# Patient Record
(demographics unavailable — no encounter records)

---

## 2024-10-25 NOTE — REVIEW OF SYSTEMS
[Negative] : Allergic/Immunologic [Skin Hyperpigmentation: Grade 1 - Hyperpigmentation covering <10% BSA; no psychosocial impact] : Skin Hyperpigmentation: Grade 1 - Hyperpigmentation covering <10% BSA; no psychosocial impact [de-identified] : right breast hyperpigmentation

## 2024-10-25 NOTE — HISTORY OF PRESENT ILLNESS
[FreeTextEntry1] : Ms. 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 Pathology yielded DCIS, micropapillary and cribriform type, high nuclear grade, spanning 4.5 cm, DCIS present <1mm from inked anterior, <1mm inked lateral, 1.5mm from inked superior, + calcs. Supplemental right superior margin with DCIS present 1mm from final superior margin, supplemental lateral margin with DCIS >2mm, supplemental right anterior margin negative; 0/10 lymph nodes ER-AZ-. Re-excision of right lateral superior margin revealed DCIS located more than 2mm from superior margin. Planned for adjuvant radiation. 2/2/2024 She completed radiation to the right breast to a total dose of 5240cGy in 20 Fx(4240cGy/16Fx plus 1000cGy/4 Fx boost).  3/8/2024 She presents for post treatment evaluation. While on treatment, she developed mild hyperpigmentation managed with Aquaphor and Aloe. Sharp shooting pain to the right breast and nipple sensitivity.  Follow up with Dr Patterson on 3/4/24-  mammo/US 10/2024  10/1/2024 Post treatment Diagnostic Mammo/sono: BI-RADS 2 - Benign Finding of cyst measuring 0.4 x 0.3 x 0.2 cm. Mammography in 1 year  10/25/2024 She presents for follow up. Right breast post radiation atrophy. She followed up with Dr Patterson on 10/2/24- MRI breast 4/2025. RUE swelling is resolved.

## 2024-10-25 NOTE — REVIEW OF SYSTEMS
[Negative] : Allergic/Immunologic [Skin Hyperpigmentation: Grade 1 - Hyperpigmentation covering <10% BSA; no psychosocial impact] : Skin Hyperpigmentation: Grade 1 - Hyperpigmentation covering <10% BSA; no psychosocial impact [de-identified] : right breast hyperpigmentation

## 2024-10-25 NOTE — PHYSICAL EXAM
[Sclera] : the sclera and conjunctiva were normal [Extraocular Movements] : extraocular movements were intact [] : no respiratory distress [Exaggerated Use Of Accessory Muscles For Inspiration] : no accessory muscle use [Heart Sounds] : normal S1 and S2 [Arterial Pulses Normal] : the arterial pulses were normal [Musculoskeletal - Swelling] : no joint swelling [Nail Clubbing] : no clubbing  or cyanosis of the fingernails [Abdomen Soft] : soft [Nondistended] : nondistended [Normal] : normal spine exam without palpable tenderness, no kyphosis or scoliosis [de-identified] : well-healing right upper inner circumareolar incision, C/D/I. L>R breast. No palpable abnormalities. [de-identified] : mild hyperpigmentation of the right breast

## 2024-10-25 NOTE — PHYSICAL EXAM
[Sclera] : the sclera and conjunctiva were normal [Extraocular Movements] : extraocular movements were intact [] : no respiratory distress [Exaggerated Use Of Accessory Muscles For Inspiration] : no accessory muscle use [Heart Sounds] : normal S1 and S2 [Arterial Pulses Normal] : the arterial pulses were normal [Musculoskeletal - Swelling] : no joint swelling [Nail Clubbing] : no clubbing  or cyanosis of the fingernails [Abdomen Soft] : soft [Nondistended] : nondistended [Normal] : normal spine exam without palpable tenderness, no kyphosis or scoliosis [de-identified] : well-healing right upper inner circumareolar incision, C/D/I. L>R breast. No palpable abnormalities. [de-identified] : mild hyperpigmentation of the right breast

## 2024-10-25 NOTE — HISTORY OF PRESENT ILLNESS
[FreeTextEntry1] : Ms. 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 Pathology yielded DCIS, micropapillary and cribriform type, high nuclear grade, spanning 4.5 cm, DCIS present <1mm from inked anterior, <1mm inked lateral, 1.5mm from inked superior, + calcs. Supplemental right superior margin with DCIS present 1mm from final superior margin, supplemental lateral margin with DCIS >2mm, supplemental right anterior margin negative; 0/10 lymph nodes ER-NV-. Re-excision of right lateral superior margin revealed DCIS located more than 2mm from superior margin. Planned for adjuvant radiation. 2/2/2024 She completed radiation to the right breast to a total dose of 5240cGy in 20 Fx(4240cGy/16Fx plus 1000cGy/4 Fx boost).  3/8/2024 She presents for post treatment evaluation. While on treatment, she developed mild hyperpigmentation managed with Aquaphor and Aloe. Sharp shooting pain to the right breast and nipple sensitivity.  Follow up with Dr Patterson on 3/4/24-  mammo/US 10/2024  10/1/2024 Post treatment Diagnostic Mammo/sono: BI-RADS 2 - Benign Finding of cyst measuring 0.4 x 0.3 x 0.2 cm. Mammography in 1 year  10/25/2024 She presents for follow up. Right breast post radiation atrophy. She followed up with Dr Patterson on 10/2/24- MRI breast 4/2025. RUE swelling is resolved.

## 2025-03-06 NOTE — HISTORY OF PRESENT ILLNESS
[FreeTextEntry1] : follow up [de-identified] : follow up  went to ER for back pain/sciatica will refer to ortho spine

## 2025-03-06 NOTE — HEALTH RISK ASSESSMENT
[No] : In the past 12 months have you used drugs other than those required for medical reasons? No [No falls in past year] : Patient reported no falls in the past year [0] : 2) Feeling down, depressed, or hopeless: Not at all (0) [Never] : Never [Audit-CScore] : 0 [de-identified] : walking [de-identified] : healthy [NER1Dnqvh] : 0

## 2025-03-13 NOTE — HISTORY OF PRESENT ILLNESS
[de-identified] : The patient was recently seen in the ED complaining of left-sided low back pain radiating to left leg that for about 3 weeks ago. Pain did not respond to Aleve, and Advil but did not work.  She states she was bending over washing her hair for approximately 15 minutes and her back started to hurt. She was given cyclobenzaprine that helps slightly.  The pain is primarily located in the lower back.  She denies any weakness, bowel or bladder dysfunction.  There has been no imaging studies.  She has not had any physical therapy, acupuncture or pain management.

## 2025-03-13 NOTE — HISTORY OF PRESENT ILLNESS
[de-identified] : The patient was recently seen in the ED complaining of left-sided low back pain radiating to left leg that for about 3 weeks ago. Pain did not respond to Aleve, and Advil but did not work.  She states she was bending over washing her hair for approximately 15 minutes and her back started to hurt. She was given cyclobenzaprine that helps slightly.  The pain is primarily located in the lower back.  She denies any weakness, bowel or bladder dysfunction.  There has been no imaging studies.  She has not had any physical therapy, acupuncture or pain management.

## 2025-03-13 NOTE — ASSESSMENT
[FreeTextEntry1] : 38 years old woman with low back pain radiating to left leg However the primary pain is in the lower back.  Normal neurological exam.  I have suggested a course of physical therapy and will arrange for her to get lumbar flexion-extension x-rays.

## 2025-03-13 NOTE — PHYSICAL EXAM
[General Appearance - Alert] : alert [General Appearance - In No Acute Distress] : in no acute distress [General Appearance - Well Nourished] : well nourished [General Appearance - Well Developed] : well developed [Oriented To Time, Place, And Person] : oriented to person, place, and time [Impaired Insight] : insight and judgment were intact [Motor Strength] : muscle strength was normal in all four extremities [Balance] : balance was intact [Able to toe walk] : the patient was able to toe walk [Able to heel walk] : the patient was able to heel walk [No Spinal Tenderness] : no spinal tenderness [Abnormal Walk] : normal gait [Involuntary Movements] : no involuntary movements were seen [Musculoskeletal - Swelling] : no joint swelling seen [Motor Tone] : muscle strength and tone were normal [2+] : Ankle jerk left 2+ [Straight-Leg Raise Test - Left] : straight leg raise of the left leg was negative [Straight-Leg Raise Test - Right] : straight leg raise  of the right leg was negative

## 2025-05-01 NOTE — ASSESSMENT
[FreeTextEntry1] : IMPRESSION  38 years old woman with low back pain radiating to left leg, but the primary pain is in the lower back. Normal neurological exam. She is continuing with PT for her back. Lumbar flexion-extension x-rays report no instability.   PLAN Continue PT for back and core strengthening exercises F/U as needed/worsening symptoms

## 2025-05-01 NOTE — HISTORY OF PRESENT ILLNESS
[FreeTextEntry1] : The patient was recently seen in the ED complaining of left-sided low back pain radiating to left leg that for about 3 weeks ago. Pain did not respond to Aleve, and Advil but did not work.  She states she was bending over washing her hair for approximately 15 minutes and her back started to hurt. She was given cyclobenzaprine that helps slightly.  She has not had any physical therapy, acupuncture or pain management.  The pain is primarily located in the lower back. Pt continue doing PT for her back, which is helping.  She also uses the lidocaine patch and heating pad also helping. She denies any weakness, bowel or bladder dysfunction.   Recent x-rays do not show any fracture, subluxation or evidence of dynamic instability.  Recent x-rays do not show any evidence of fracture, subluxation or dynamic instability.

## 2025-06-02 NOTE — PAST MEDICAL HISTORY
[Menstruating] : The patient is menstruating [Menarche Age ____] : age at menarche was [unfilled] [Definite ___ (Date)] : the last menstrual period was [unfilled] [Irregular Cycle Intervals] : are  irregular [Total Preg ___] : G[unfilled] [Live Births ___] : P[unfilled]  [Age At Live Birth ___] : Age at live birth: [unfilled] [History of Hormone Replacement Treatment] : has no history of hormone replacement treatment [FreeTextEntry6] : None [FreeTextEntry7] : 12 years, not currently [FreeTextEntry8] : 1 year
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
## 2025-06-02 NOTE — ASSESSMENT
[FreeTextEntry1] : Ms. MEENU WHITTINGTON is a 38-year-old woman, referred for consultation by Carmen Bonner NP for Right breast DCIS, s/p R lumpectomy and SLNB on 11/17/2023 and R breast margin re-excision on 11/30/2023, here for a follow up visit.  11/17/2023 R Lumpectomy & SLNB -R DCIS 45 mm, high grade w/ necrosis, ER/UT negative -0/10 R axillary SLN -Negative margins but 0.5 mm focus at 1 mm from the superior margin -ER/UT negative -pTisN0, AJCC stage 0  11/30/2023 R breast margin re-excision -R DCIS (15 mm) high nuclear grade w/ necrosis -Negative margins -pTisN0, AJCC stage 0  Patient completed adjuvant RT with Dr. Anel Viramontes 1/4/2024- 1/31/2024.  Recent Imaging, BR2.  Exam today shows expected skin changes from RT, no new masses or lymphadenopathy  Plan: -B/L SM/US 10/2025 - MRI 10/2025  - Continue with PT/breast massage/Lymphedema f/u  - RTO 6 months

## 2025-06-02 NOTE — PHYSICAL EXAM
[Normocephalic] : normocephalic [Atraumatic] : atraumatic [EOMI] : extra ocular movement intact [PERRL] : pupils equal, round and reactive to light [Sclera nonicteric] : sclera nonicteric [Supple] : supple [No Supraclavicular Adenopathy] : no supraclavicular adenopathy [No Cervical Adenopathy] : no cervical adenopathy [Examined in the supine and seated position] : examined in the supine and seated position [Asymmetrical] : asymmetrical [Bra Size: ___] : Bra Size: [unfilled] [No dominant masses] : no dominant masses in right breast  [No dominant masses] : no dominant masses left breast [No Nipple Retraction] : no left nipple retraction [No Nipple Discharge] : no left nipple discharge [Breast Mass Right Breast ___cm] : no masses [Breast Mass Left Breast ___cm] : no masses [Breast Nipple Inversion] : nipples not inverted [Breast Nipple Retraction] : nipples not retracted [Breast Nipple Flattening] : nipples not flattened [Breast Nipple Fissures] : nipples not fissured [Breast Abnormal Lactation (Galactorrhea)] : no galactorrhea [Breast Abnormal Secretion Bloody Fluid] : no bloody discharge [Breast Abnormal Secretion Serous Fluid] : no serous discharge [Breast Abnormal Secretion Opalescent Fluid] : no milky discharge [No Axillary Lymphadenopathy] : no left axillary lymphadenopathy [No Edema] : no edema [No Swelling] : no swelling [No Rashes] : no rashes [No Ulceration] : no ulceration [de-identified] : non-labored respirations  [de-identified] : L>R [de-identified] : upper inner circumareolar incision well-healed, induration in area of surgery. PostRT skin hyperpigmentation [de-identified] : well-healed incision, no seroma, no erythema, post RT change

## 2025-06-02 NOTE — PHYSICAL EXAM
[Normocephalic] : normocephalic [Atraumatic] : atraumatic [EOMI] : extra ocular movement intact [PERRL] : pupils equal, round and reactive to light [Sclera nonicteric] : sclera nonicteric [Supple] : supple [No Supraclavicular Adenopathy] : no supraclavicular adenopathy [No Cervical Adenopathy] : no cervical adenopathy [Examined in the supine and seated position] : examined in the supine and seated position [Asymmetrical] : asymmetrical [Bra Size: ___] : Bra Size: [unfilled] [No dominant masses] : no dominant masses in right breast  [No dominant masses] : no dominant masses left breast [No Nipple Retraction] : no left nipple retraction [No Nipple Discharge] : no left nipple discharge [Breast Mass Right Breast ___cm] : no masses [Breast Mass Left Breast ___cm] : no masses [Breast Nipple Inversion] : nipples not inverted [Breast Nipple Retraction] : nipples not retracted [Breast Nipple Flattening] : nipples not flattened [Breast Nipple Fissures] : nipples not fissured [Breast Abnormal Lactation (Galactorrhea)] : no galactorrhea [Breast Abnormal Secretion Bloody Fluid] : no bloody discharge [Breast Abnormal Secretion Serous Fluid] : no serous discharge [Breast Abnormal Secretion Opalescent Fluid] : no milky discharge [No Axillary Lymphadenopathy] : no left axillary lymphadenopathy [No Edema] : no edema [No Swelling] : no swelling [No Rashes] : no rashes [No Ulceration] : no ulceration [de-identified] : non-labored respirations  [de-identified] : L>R [de-identified] : upper inner circumareolar incision well-healed, induration in area of surgery. PostRT skin hyperpigmentation [de-identified] : well-healed incision, no seroma, no erythema, post RT change

## 2025-06-02 NOTE — ASSESSMENT
[FreeTextEntry1] : Ms. MEENU WHITTINGTON is a 38-year-old woman, referred for consultation by Carmen Bonner NP for Right breast DCIS, s/p R lumpectomy and SLNB on 11/17/2023 and R breast margin re-excision on 11/30/2023, here for a follow up visit.  11/17/2023 R Lumpectomy & SLNB -R DCIS 45 mm, high grade w/ necrosis, ER/MT negative -0/10 R axillary SLN -Negative margins but 0.5 mm focus at 1 mm from the superior margin -ER/MT negative -pTisN0, AJCC stage 0  11/30/2023 R breast margin re-excision -R DCIS (15 mm) high nuclear grade w/ necrosis -Negative margins -pTisN0, AJCC stage 0  Patient completed adjuvant RT with Dr. Anel Viramontes 1/4/2024- 1/31/2024.  Recent Imaging, BR2.  Exam today shows expected skin changes from RT, no new masses or lymphadenopathy  Plan: -B/L SM/US 10/2025 - MRI 10/2025  - Continue with PT/breast massage/Lymphedema f/u  - RTO 6 months
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
## 2025-06-02 NOTE — CONSULT LETTER
[Dear  ___] : Dear ~SADAF, [Courtesy Letter:] : I had the pleasure of seeing your patient, [unfilled], in my office today. [Please see my note below.] : Please see my note below. [Consult Closing:] : Thank you very much for allowing me to participate in the care of this patient.  If you have any questions, please do not hesitate to contact me. [Sincerely,] : Sincerely, [Dear  ___] : Dear  [unfilled], [FreeTextEntry3] : Lyla Patterson MD Breast Surgeon Division of Surgical Oncology Department of Surgery [address] [place] [address]  Tel: [phone] Fax: [phone] Email: [email or web address]

## 2025-06-02 NOTE — CONSULT LETTER
[Dear  ___] : Dear ~SADAF, [Courtesy Letter:] : I had the pleasure of seeing your patient, [unfilled], in my office today. [Please see my note below.] : Please see my note below. [Consult Closing:] : Thank you very much for allowing me to participate in the care of this patient.  If you have any questions, please do not hesitate to contact me. [Sincerely,] : Sincerely, [Dear  ___] : Dear  [unfilled], [FreeTextEntry3] : Lyla Patterson MD Breast Surgeon Division of Surgical Oncology Department of Surgery [address] [place] [address]  Tel: [phone] Fax: [phone] Email: [email or web address]